# Patient Record
Sex: MALE | Race: WHITE | NOT HISPANIC OR LATINO | Employment: FULL TIME | URBAN - METROPOLITAN AREA
[De-identification: names, ages, dates, MRNs, and addresses within clinical notes are randomized per-mention and may not be internally consistent; named-entity substitution may affect disease eponyms.]

---

## 2023-05-22 ENCOUNTER — HOSPITAL ENCOUNTER (EMERGENCY)
Facility: CLINIC | Age: 23
Discharge: HOME | End: 2023-05-22
Attending: EMERGENCY MEDICINE | Admitting: EMERGENCY MEDICINE
Payer: COMMERCIAL

## 2023-05-22 VITALS
RESPIRATION RATE: 16 BRPM | HEART RATE: 52 BPM | WEIGHT: 170 LBS | HEIGHT: 74 IN | OXYGEN SATURATION: 100 % | BODY MASS INDEX: 21.82 KG/M2 | DIASTOLIC BLOOD PRESSURE: 78 MMHG | TEMPERATURE: 97.8 F | SYSTOLIC BLOOD PRESSURE: 149 MMHG

## 2023-05-22 DIAGNOSIS — S01.112A LEFT EYELID LACERATION, INITIAL ENCOUNTER: ICD-10-CM

## 2023-05-22 PROCEDURE — 99283 EMERGENCY DEPT VISIT LOW MDM: CPT | Performed by: EMERGENCY MEDICINE

## 2023-05-22 PROCEDURE — 99283 EMERGENCY DEPT VISIT LOW MDM: CPT | Mod: 25 | Performed by: EMERGENCY MEDICINE

## 2023-05-22 PROCEDURE — 12011 RPR F/E/E/N/L/M 2.5 CM/<: CPT | Performed by: EMERGENCY MEDICINE

## 2023-05-22 ASSESSMENT — ENCOUNTER SYMPTOMS
GASTROINTESTINAL NEGATIVE: 1
CONSTITUTIONAL NEGATIVE: 1
ALLERGIC/IMMUNOLOGIC NEGATIVE: 1
NEUROLOGICAL NEGATIVE: 1
MUSCULOSKELETAL NEGATIVE: 1
HEMATOLOGIC/LYMPHATIC NEGATIVE: 1
EYES NEGATIVE: 1
ENDOCRINE NEGATIVE: 1
WOUND: 1
PSYCHIATRIC NEGATIVE: 1
RESPIRATORY NEGATIVE: 1
CARDIOVASCULAR NEGATIVE: 1

## 2023-05-22 ASSESSMENT — VISUAL ACUITY
OS: 20/20
OD: 20/25
OU: 1

## 2023-05-22 ASSESSMENT — ACTIVITIES OF DAILY LIVING (ADL): ADLS_ACUITY_SCORE: 35

## 2023-05-22 NOTE — ED TRIAGE NOTES
"Left eye laceration from \" dog to my eye\" vision unchanged. No LOC.      Triage Assessment     Row Name 05/22/23 6006       Triage Assessment (Adult)    Airway WDL WDL       Respiratory WDL    Respiratory WDL WDL       Skin Circulation/Temperature WDL    Skin Circulation/Temperature WDL WDL       Cardiac WDL    Cardiac WDL WDL       Peripheral/Neurovascular WDL    Peripheral Neurovascular WDL WDL       Cognitive/Neuro/Behavioral WDL    Cognitive/Neuro/Behavioral WDL WDL              "

## 2023-05-22 NOTE — DISCHARGE INSTRUCTIONS
1) Your eyelid laceration was cleaned and explored to ensure the wound depth was not through and through.  There is no foreign body in the eye.  Wound edges were well approximated with skin glue after reviewing wound closure options.  Be sure to apply topical antibiotics twice a day over the next 5 days.  Take oral antibiotics (augmentin) as prescribed.    2) if there are concerns about eyelid swelling or drainage, eye pain or pressure, concern about eyelid closure he should be seen in the eye clinic for follow-up and reevaluation.  Should call to the eye care or your eye care professional for follow-up evaluation if needed

## 2023-05-22 NOTE — ED PROVIDER NOTES
"  History     Chief Complaint   Patient presents with     Eye Injury     Left eye laceration from \" dog to my eye\" vision unchanged. No LOC.      HPI  José Burnett is a 22 year old male who presents for evaluation after a left eye injury.  On intake it was reported patient had a laceration to the left eye from    On examination patient was accompanied by his partner so he reported that he got home and there [place] short hair pet dog got excited and welcomed him home accidentally scratched him on the left upper eyelid.  Injury occurred just prior to arrival.  Since the injury patient has had no loss of vision.  No eye pressure and no other injuries.  Due to laceration involving the upper eyelid he presented for wound care and further evaluation.  Patient reports his dog's immunizations are up-to-date.    Allergies:  No Known Allergies    Problem List:    There are no problems to display for this patient.       Past Medical History:    No past medical history on file.    Past Surgical History:    No past surgical history on file.    Family History:    No family history on file.    Social History:  Marital Status:  Single [1]        Medications:    amoxicillin-clavulanate (AUGMENTIN) 875-125 MG tablet          Review of Systems   Constitutional: Negative.    HENT: Negative.    Eyes: Negative.    Respiratory: Negative.    Cardiovascular: Negative.    Gastrointestinal: Negative.    Endocrine: Negative.    Genitourinary: Negative.    Musculoskeletal: Negative.    Skin: Positive for wound. Rash: left upper eyelid laceration.   Allergic/Immunologic: Negative.    Neurological: Negative.    Hematological: Negative.    Psychiatric/Behavioral: Negative.    All other systems reviewed and are negative.      Physical Exam   BP: (!) 149/78  Pulse: 52  Temp: 97.8  F (36.6  C)  Resp: 16  Height: 188 cm (6' 2\")  Weight: 77.1 kg (170 lb)  SpO2: 100 %      Physical Exam  Constitutional:       General: He is not in acute " distress.     Appearance: Normal appearance. He is not ill-appearing, toxic-appearing or diaphoretic.   HENT:      Head: Normocephalic and atraumatic.      Right Ear: Tympanic membrane normal.      Left Ear: Tympanic membrane normal.      Nose: Nose normal.      Mouth/Throat:      Mouth: Mucous membranes are moist.   Eyes:      General: Vision grossly intact. Gaze aligned appropriately.         Left eye: No foreign body (2 cm horizontal laceration overlying the left upper eyelid), discharge or hordeolum.      Extraocular Movements:      Left eye: Normal extraocular motion and no nystagmus.      Conjunctiva/sclera:      Left eye: Left conjunctiva is not injected. No chemosis, exudate or hemorrhage.     Pupils: Pupils are equal, round, and reactive to light.     Pulmonary:      Effort: Pulmonary effort is normal. No respiratory distress.      Breath sounds: Normal breath sounds. No stridor. No wheezing, rhonchi or rales.   Chest:      Chest wall: No tenderness.   Musculoskeletal:         General: No swelling, tenderness or signs of injury.      Cervical back: Normal range of motion and neck supple.      Right lower leg: No edema.      Left lower leg: No edema.   Skin:     Capillary Refill: Capillary refill takes less than 2 seconds.      Coloration: Skin is not jaundiced or pale.      Findings: No bruising, erythema, lesion or rash.   Neurological:      General: No focal deficit present.      Mental Status: He is alert and oriented to person, place, and time.      Cranial Nerves: No cranial nerve deficit.      Sensory: No sensory deficit.      Motor: No weakness.      Coordination: Coordination normal.      Gait: Gait normal.      Deep Tendon Reflexes: Reflexes normal.   Psychiatric:         Mood and Affect: Mood normal.         Behavior: Behavior normal.         Thought Content: Thought content normal.         Judgment: Judgment normal.         ED Course                 Procedures              Critical Care time:   "none               ED medications: none      ED Vitals:  Vitals:    05/22/23 1614   BP: (!) 149/78   Pulse: 52   Resp: 16   Temp: 97.8  F (36.6  C)   TempSrc: Oral   SpO2: 100%   Weight: 77.1 kg (170 lb)   Height: 1.88 m (6' 2\")     ED labs and imaging: none        Assessments & Plan (with Medical Decision Making)   Assessment Summary and Clinical Impression: 22-year-old male who presented with left eye injury after sustaining a laceration involving the left upper eyelid accidentally after his pet dog work from home and excellently scratched on the left upper eyelid. He arrived without visual complaints. He sustained a 2 cm laceration horizontally over the left upper eyelid fold.  There is no concern for globe injury.  There is no proptosis.  No chemosis.  Normal extraocular eye muscle movements and equal reactive pupils.  After reviewing options for wound closure given location to allow for natural fold of the eyelid we agreed to wound closure with skin glue.  I explored the wound depth and there was no foreign body.  Laceration was not through and through.  Wound edges were well approximated with Dermabond.  After wound closure patient had normal eyelid opening.  He was counseled on reasons to return for evaluation including wound care measures.  Due to laceration sustained from nail from his pet dog he was placed on oral antibiotics to take twice a day over the next 5 days.    ED course and plan:  Reviewed the medical record.  Visual acuity in the right eye was 20/25 left eye was 20/20.We discussed and reviewed wound care closure options.  After reviewing care options he agreed to closure with skin glue.  After the wound was explored to ensure there was no retained foreign body and for wound depth wound was closed and reapproximated with skin glue with Dermabond.  I applied topical antibiotic ointment using a Q-tip.  Post wound closure patient's eye opening and lid closure was normal with normal cosmetic lines.  " We discussed concerning symptoms including signs of wound infection and reasons to return for evaluation.  He expressed understanding agreement with plan of care.      Disclaimer: This note consists of symbols derived from keyboarding, dictation and/or voice recognition software. As a result, there may be errors in the script that have gone undetected. Please consider this when interpreting information found in this chart.  I have reviewed the nursing notes.    I have reviewed the findings, diagnosis, plan and need for follow up with the patient.           Medical Decision Making  The patient's presentation was of moderate complexity (an acute illness with systemic symptoms).    The patient's evaluation involved:  history and exam without other MDM data elements    The patient's management necessitated only low risk treatment.        New Prescriptions    AMOXICILLIN-CLAVULANATE (AUGMENTIN) 875-125 MG TABLET    Take 1 tablet by mouth 2 times daily for 5 days       Final diagnoses:   Left eyelid laceration, initial encounter - 2cm       5/22/2023   [facility] EMERGENCY DEPT     Tristan Riddle MD  05/23/23 0002

## 2023-06-06 ENCOUNTER — HOSPITAL ENCOUNTER (EMERGENCY)
Facility: CLINIC | Age: 23
Discharge: HOME | End: 2023-06-06
Attending: PHYSICIAN ASSISTANT | Admitting: PHYSICIAN ASSISTANT
Payer: OTHER MISCELLANEOUS

## 2023-06-06 VITALS
BODY MASS INDEX: 21.82 KG/M2 | DIASTOLIC BLOOD PRESSURE: 61 MMHG | RESPIRATION RATE: 20 BRPM | WEIGHT: 170 LBS | TEMPERATURE: 97.6 F | HEART RATE: 76 BPM | HEIGHT: 74 IN | SYSTOLIC BLOOD PRESSURE: 116 MMHG | OXYGEN SATURATION: 96 %

## 2023-06-06 DIAGNOSIS — S61.412A LACERATION OF LEFT HAND WITHOUT FOREIGN BODY, INITIAL ENCOUNTER: ICD-10-CM

## 2023-06-06 PROCEDURE — 12001 RPR S/N/AX/GEN/TRNK 2.5CM/<: CPT | Performed by: PHYSICIAN ASSISTANT

## 2023-06-06 PROCEDURE — 90715 TDAP VACCINE 7 YRS/> IM: CPT | Performed by: PHYSICIAN ASSISTANT

## 2023-06-06 PROCEDURE — 90471 IMMUNIZATION ADMIN: CPT | Performed by: PHYSICIAN ASSISTANT

## 2023-06-06 PROCEDURE — 99203 OFFICE O/P NEW LOW 30 MIN: CPT | Mod: 25 | Performed by: PHYSICIAN ASSISTANT

## 2023-06-06 PROCEDURE — 250N000011 HC RX IP 250 OP 636: Performed by: PHYSICIAN ASSISTANT

## 2023-06-06 PROCEDURE — G0463 HOSPITAL OUTPT CLINIC VISIT: HCPCS | Mod: 25 | Performed by: PHYSICIAN ASSISTANT

## 2023-06-06 RX ADMIN — CLOSTRIDIUM TETANI TOXOID ANTIGEN (FORMALDEHYDE INACTIVATED), CORYNEBACTERIUM DIPHTHERIAE TOXOID ANTIGEN (FORMALDEHYDE INACTIVATED), BORDETELLA PERTUSSIS TOXOID ANTIGEN (GLUTARALDEHYDE INACTIVATED), BORDETELLA PERTUSSIS FILAMENTOUS HEMAGGLUTININ ANTIGEN (FORMALDEHYDE INACTIVATED), BORDETELLA PERTUSSIS PERTACTIN ANTIGEN, AND BORDETELLA PERTUSSIS FIMBRIAE 2/3 ANTIGEN 0.5 ML: 5; 2; 2.5; 5; 3; 5 INJECTION, SUSPENSION INTRAMUSCULAR at 13:34

## 2023-06-06 ASSESSMENT — ACTIVITIES OF DAILY LIVING (ADL): ADLS_ACUITY_SCORE: 35

## 2023-06-06 NOTE — ED PROVIDER NOTES
"  History     Chief Complaint   Patient presents with     Laceration     Left hand     HPI  José Burnett is a 22 year old right-hand-dominant male who presents to urgent care with concern of a laceration to the palm of his left hand which occurred one hour prior to arrival when he was using an [employer] blade to open a box at work and hand slipped resulting in laceration.  He had significant bleeding at onset which is now controlled with bandage after direct pressure.  He denies any significant pain in the area.  No concern for foreign body embedded in the wound.  No distal numbness or paresthesias.  He is unsure the date of his last tetanus vaccine however [facility] records indicate he is due for vaccination today.      Allergies:  No Known Allergies    Problem List:    There are no problems to display for this patient.       Past Medical History:    History reviewed. No pertinent past medical history.    Past Surgical History:    History reviewed. No pertinent surgical history.    Family History:    History reviewed. No pertinent family history.    Social History:  Marital Status:  Single [1]  Social History     Tobacco Use     Smoking status: Never     Smokeless tobacco: Never        Medications:    No current outpatient medications on file.    Review of Systems  INTEGUMENTARY/SKIN: POSITIVE for laceration NEGATIVE for erythema, ecchymosis, worrisome rashes   MUSCULOSKELETAL: POSITIVE  for mild discomfort at site of laceration and NEGATIVE for other concerning arthralgias or myalgias   NEURO: NEGATIVE for numbness, weakness, paresthesias   Physical Exam   BP: 116/61  Pulse: 76  Temp: 97.6  F (36.4  C)  Resp: 20  Height: 188 cm (6' 2\")  Weight: 77.1 kg (170 lb)  SpO2: 96 %  Physical Exam  Constitutional:       General: He is not in acute distress.     Appearance: He is not ill-appearing or toxic-appearing.   HENT:      Head: Normocephalic and atraumatic.   Musculoskeletal:      Left wrist: Normal.      Left hand: " Laceration and tenderness present. No swelling, deformity or bony tenderness. Normal range of motion. Normal strength. Normal sensation. There is no disruption of two-point discrimination. Normal capillary refill. Normal pulse.        Hands:       Comments: 1.5 cm subcutaneous laceration to palm of left hand, no active bleeding at this time   Skin:     General: Skin is warm and dry.      Findings: Laceration present. No abrasion, ecchymosis, erythema or rash.   Neurological:      Mental Status: He is alert.      Sensory: No sensory deficit.         ED [facility]    -Laceration Repair    Date/Time: 6/6/2023 1:22 PM    Performed by: Ewa Monsalve PA-C  Authorized by: Ewa Monsalve PA-C    Risks, benefits and alternatives discussed.      ANESTHESIA (see MAR for exact dosages):     Anesthesia method:  Local infiltration    Local anesthetic:  Lidocaine 1% w/o epi  LACERATION DETAILS     Location:  Hand    Hand location:  L palm    Length (cm):  1.5    REPAIR TYPE:     Repair type:  Simple      EXPLORATION:     Hemostasis achieved with:  Direct pressure    Wound exploration: wound explored through full range of motion      Wound extent: no nerve damage, no tendon damage, no underlying fracture and no vascular damage      SKIN REPAIR     Repair method:  Sutures    Suture size:  4-0    Suture material:  Nylon    Suture technique:  Simple interrupted    Number of sutures:  3    APPROXIMATION     Approximation:  Close    POST-PROCEDURE DETAILS     Dressing:  Antibiotic ointment and adhesive bandage        PROCEDURE    Patient Tolerance:  Patient tolerated the procedure well with no immediate complications         Critical Care time:  none        No results found for this or any previous visit (from the past 24 hour(s)).    Medications   Tdap (tetanus-diphtheria-acell pertussis) (ADACEL) injection 0.5 mL (0.5 mLs Intramuscular $Given 6/6/23 1542)     Assessments & Plan (with  Medical Decision Making)     I have reviewed the nursing notes.  I have reviewed the findings, diagnosis, plan and need for follow up with the patient.       New Prescriptions    No medications on file       Final diagnoses:   Laceration of left hand without foreign body, initial encounter     22-year-old male presents to urgent care with concern of a laceration to the palm of his left hand which occurred during a work-related injury earlier this afternoon.  Physical exam findings were significant for a with 3.5 cm subcutaneous laceration to the palm of his left hand, after discussing her/benefits patient agreed to proceed with primary closure of his wound with sutures.  He tolerated placement of three 4-0 nylon sutures without any immediate complications.  His wound was dressed with bacitracin, adhesive bandage and tetanus vaccine was updated prior to discharge.  Discharged home with instructions for suture movable in 7 to 10 days.  Suture care instructions, signs of infection, worrisome reasons to return to ER/UC sooner discussed.    Disclaimer: This note consists of symbols derived from keyboarding, dictation, and/or voice recognition software. As a result, there may be errors in the script that have gone undetected.  Please consider this when interpreting information found in the chart.    6/6/2023   [facility] EMERGENCY DEPT     Ewa Monsalve PA-C  06/06/23 6929